# Patient Record
Sex: FEMALE | Race: WHITE | NOT HISPANIC OR LATINO
[De-identification: names, ages, dates, MRNs, and addresses within clinical notes are randomized per-mention and may not be internally consistent; named-entity substitution may affect disease eponyms.]

---

## 2017-05-18 ENCOUNTER — HOSPITAL (OUTPATIENT)
Dept: OTHER | Age: 82
End: 2017-05-18
Attending: INTERNAL MEDICINE

## 2017-06-29 ENCOUNTER — CHARTING TRANS (OUTPATIENT)
Dept: OTHER | Age: 82
End: 2017-06-29

## 2017-06-29 ASSESSMENT — PAIN SCALES - GENERAL: PAINLEVEL_OUTOF10: 0

## 2017-07-12 ENCOUNTER — HOSPITAL (OUTPATIENT)
Dept: OTHER | Age: 82
End: 2017-07-12
Attending: FAMILY MEDICINE

## 2017-07-12 ENCOUNTER — IMAGING SERVICES (OUTPATIENT)
Dept: OTHER | Age: 82
End: 2017-07-12

## 2017-07-13 ENCOUNTER — IMAGING SERVICES (OUTPATIENT)
Dept: OTHER | Age: 82
End: 2017-07-13

## 2017-09-07 ENCOUNTER — CHARTING TRANS (OUTPATIENT)
Dept: OTHER | Age: 82
End: 2017-09-07

## 2018-01-01 ENCOUNTER — APPOINTMENT (OUTPATIENT)
Dept: CT IMAGING | Facility: HOSPITAL | Age: 83
DRG: 066 | End: 2018-01-01
Attending: EMERGENCY MEDICINE
Payer: MEDICARE

## 2018-01-01 ENCOUNTER — HOSPITAL ENCOUNTER (INPATIENT)
Facility: HOSPITAL | Age: 83
LOS: 1 days | Discharge: HOSPICE | DRG: 066 | End: 2018-01-01
Attending: EMERGENCY MEDICINE | Admitting: HOSPITALIST
Payer: MEDICARE

## 2018-01-01 ENCOUNTER — HOSPITAL ENCOUNTER (INPATIENT)
Facility: HOSPITAL | Age: 83
LOS: 1 days | DRG: 066 | End: 2018-01-01
Attending: HOSPITALIST | Admitting: HOSPITALIST
Payer: OTHER MISCELLANEOUS

## 2018-01-01 VITALS
TEMPERATURE: 102 F | HEART RATE: 116 BPM | RESPIRATION RATE: 12 BRPM | OXYGEN SATURATION: 87 % | DIASTOLIC BLOOD PRESSURE: 51 MMHG | SYSTOLIC BLOOD PRESSURE: 101 MMHG

## 2018-01-01 VITALS
DIASTOLIC BLOOD PRESSURE: 86 MMHG | RESPIRATION RATE: 13 BRPM | OXYGEN SATURATION: 97 % | HEART RATE: 90 BPM | SYSTOLIC BLOOD PRESSURE: 119 MMHG | TEMPERATURE: 98 F

## 2018-01-01 DIAGNOSIS — I61.3 PONTINE HEMORRHAGE (HCC): Primary | ICD-10-CM

## 2018-01-01 LAB
ALBUMIN SERPL-MCNC: 3.6 G/DL (ref 3.5–4.8)
ALP LIVER SERPL-CCNC: 90 U/L (ref 55–142)
ALT SERPL-CCNC: 23 U/L (ref 14–54)
ANTIBODY SCREEN: NEGATIVE
APTT PPP: 28.3 SECONDS (ref 25–34)
AST SERPL-CCNC: 29 U/L (ref 15–41)
ATRIAL RATE: 98 BPM
BASOPHILS # BLD AUTO: 0.04 X10(3) UL (ref 0–0.1)
BASOPHILS NFR BLD AUTO: 0.4 %
BILIRUB SERPL-MCNC: 1.6 MG/DL (ref 0.1–2)
BUN BLD-MCNC: 22 MG/DL (ref 8–20)
CALCIUM BLD-MCNC: 9.4 MG/DL (ref 8.3–10.3)
CHLORIDE: 99 MMOL/L (ref 101–111)
CO2: 25 MMOL/L (ref 22–32)
CREAT BLD-MCNC: 1.04 MG/DL (ref 0.55–1.02)
EOSINOPHIL # BLD AUTO: 0.06 X10(3) UL (ref 0–0.3)
EOSINOPHIL NFR BLD AUTO: 0.6 %
ERYTHROCYTE [DISTWIDTH] IN BLOOD BY AUTOMATED COUNT: 13.1 % (ref 11.5–16)
GLUCOSE BLD-MCNC: 150 MG/DL (ref 70–99)
HCT VFR BLD AUTO: 43.2 % (ref 34–50)
HGB BLD-MCNC: 14.7 G/DL (ref 12–16)
IMMATURE GRANULOCYTE COUNT: 0.04 X10(3) UL (ref 0–1)
IMMATURE GRANULOCYTE RATIO %: 0.4 %
INR BLD: 1.16 (ref 0.89–1.11)
LYMPHOCYTES # BLD AUTO: 3.87 X10(3) UL (ref 0.9–4)
LYMPHOCYTES NFR BLD AUTO: 38.1 %
M PROTEIN MFR SERPL ELPH: 7.4 G/DL (ref 6.1–8.3)
MCH RBC QN AUTO: 33.4 PG (ref 27–33.2)
MCHC RBC AUTO-ENTMCNC: 34 G/DL (ref 31–37)
MCV RBC AUTO: 98.2 FL (ref 81–100)
MONOCYTES # BLD AUTO: 0.84 X10(3) UL (ref 0.1–1)
MONOCYTES NFR BLD AUTO: 8.3 %
NEUTROPHIL ABS PRELIM: 5.31 X10 (3) UL (ref 1.3–6.7)
NEUTROPHILS # BLD AUTO: 5.31 X10(3) UL (ref 1.3–6.7)
NEUTROPHILS NFR BLD AUTO: 52.2 %
PLATELET # BLD AUTO: 198 10(3)UL (ref 150–450)
POTASSIUM SERPL-SCNC: 3.6 MMOL/L (ref 3.6–5.1)
PSA SERPL DL<=0.01 NG/ML-MCNC: 14.9 SECONDS (ref 12–14.3)
Q-T INTERVAL: 348 MS
QRS DURATION: 78 MS
QTC CALCULATION (BEZET): 441 MS
R AXIS: 79 DEGREES
RBC # BLD AUTO: 4.4 X10(6)UL (ref 3.8–5.1)
RED CELL DISTRIBUTION WIDTH-SD: 46.6 FL (ref 35.1–46.3)
RH BLOOD TYPE: POSITIVE
SODIUM SERPL-SCNC: 134 MMOL/L (ref 136–144)
T AXIS: 4 DEGREES
VENTRICULAR RATE: 97 BPM
WBC # BLD AUTO: 10.2 X10(3) UL (ref 4–13)

## 2018-01-01 PROCEDURE — 70496 CT ANGIOGRAPHY HEAD: CPT | Performed by: EMERGENCY MEDICINE

## 2018-01-01 PROCEDURE — 70498 CT ANGIOGRAPHY NECK: CPT | Performed by: EMERGENCY MEDICINE

## 2018-01-01 PROCEDURE — 99231 SBSQ HOSP IP/OBS SF/LOW 25: CPT | Performed by: HOSPITALIST

## 2018-01-01 PROCEDURE — 99223 1ST HOSP IP/OBS HIGH 75: CPT | Performed by: HOSPITALIST

## 2018-01-01 RX ORDER — HYDROMORPHONE HYDROCHLORIDE 1 MG/ML
0.5 INJECTION, SOLUTION INTRAMUSCULAR; INTRAVENOUS; SUBCUTANEOUS EVERY 30 MIN PRN
Status: DISCONTINUED | OUTPATIENT
Start: 2018-01-01 | End: 2018-01-01 | Stop reason: ALTCHOICE

## 2018-01-01 RX ORDER — BISACODYL 10 MG
10 SUPPOSITORY, RECTAL RECTAL
Status: DISCONTINUED | OUTPATIENT
Start: 2018-01-01 | End: 2018-01-01

## 2018-01-01 RX ORDER — ONDANSETRON 4 MG/1
4 TABLET, ORALLY DISINTEGRATING ORAL EVERY 6 HOURS PRN
Status: DISCONTINUED | OUTPATIENT
Start: 2018-01-01 | End: 2018-01-01

## 2018-01-01 RX ORDER — ATROPINE SULFATE 10 MG/ML
2 SOLUTION/ DROPS OPHTHALMIC EVERY 2 HOUR PRN
Status: DISCONTINUED | OUTPATIENT
Start: 2018-01-01 | End: 2018-01-01

## 2018-01-01 RX ORDER — FUROSEMIDE 40 MG/1
40 TABLET ORAL EVERY 8 HOURS PRN
Status: DISCONTINUED | OUTPATIENT
Start: 2018-01-01 | End: 2018-01-01

## 2018-01-01 RX ORDER — LORAZEPAM 2 MG/ML
1 INJECTION INTRAMUSCULAR EVERY 4 HOURS PRN
Status: DISCONTINUED | OUTPATIENT
Start: 2018-01-01 | End: 2018-01-01

## 2018-01-01 RX ORDER — SCOLOPAMINE TRANSDERMAL SYSTEM 1 MG/1
1 PATCH, EXTENDED RELEASE TRANSDERMAL
Status: DISCONTINUED | OUTPATIENT
Start: 2018-01-01 | End: 2018-01-01

## 2018-01-01 RX ORDER — MORPHINE SULFATE 2 MG/ML
2 INJECTION, SOLUTION INTRAMUSCULAR; INTRAVENOUS EVERY 2 HOUR PRN
Status: DISCONTINUED | OUTPATIENT
Start: 2018-01-01 | End: 2018-01-01

## 2018-01-01 RX ORDER — MELATONIN
325
COMMUNITY
End: 2018-01-01

## 2018-01-01 RX ORDER — ASCORBIC ACID 500 MG
500 TABLET ORAL 2 TIMES DAILY
COMMUNITY
End: 2018-01-01

## 2018-01-01 RX ORDER — HALOPERIDOL 5 MG/ML
1 INJECTION INTRAMUSCULAR
Status: DISCONTINUED | OUTPATIENT
Start: 2018-01-01 | End: 2018-01-01

## 2018-01-01 RX ORDER — GLYCOPYRROLATE 0.2 MG/ML
0.4 INJECTION, SOLUTION INTRAMUSCULAR; INTRAVENOUS
Status: DISCONTINUED | OUTPATIENT
Start: 2018-01-01 | End: 2018-01-01

## 2018-01-01 RX ORDER — MORPHINE SULFATE 2 MG/ML
2 INJECTION, SOLUTION INTRAMUSCULAR; INTRAVENOUS ONCE
Status: COMPLETED | OUTPATIENT
Start: 2018-01-01 | End: 2018-01-01

## 2018-01-01 RX ORDER — MORPHINE SULFATE 2 MG/ML
1 INJECTION, SOLUTION INTRAMUSCULAR; INTRAVENOUS EVERY 2 HOUR PRN
Status: DISCONTINUED | OUTPATIENT
Start: 2018-01-01 | End: 2018-01-01

## 2018-01-01 RX ORDER — POTASSIUM CHLORIDE 750 MG/1
10 TABLET, EXTENDED RELEASE ORAL 2 TIMES DAILY
COMMUNITY
End: 2018-01-01

## 2018-01-01 RX ORDER — ONDANSETRON 2 MG/ML
4 INJECTION INTRAMUSCULAR; INTRAVENOUS EVERY 6 HOURS PRN
Status: DISCONTINUED | OUTPATIENT
Start: 2018-01-01 | End: 2018-01-01

## 2018-01-01 RX ORDER — LABETALOL HYDROCHLORIDE 5 MG/ML
INJECTION, SOLUTION INTRAVENOUS
Status: COMPLETED
Start: 2018-01-01 | End: 2018-01-01

## 2018-01-01 RX ORDER — SCOLOPAMINE TRANSDERMAL SYSTEM 1 MG/1
1 PATCH, EXTENDED RELEASE TRANSDERMAL
Status: CANCELLED | OUTPATIENT
Start: 2018-02-28

## 2018-01-01 RX ORDER — MORPHINE SULFATE 2 MG/ML
2 INJECTION, SOLUTION INTRAMUSCULAR; INTRAVENOUS ONCE
Status: CANCELLED | OUTPATIENT
Start: 2018-01-01 | End: 2018-01-01

## 2018-01-01 RX ORDER — MORPHINE SULFATE 2 MG/ML
1 INJECTION, SOLUTION INTRAMUSCULAR; INTRAVENOUS
Status: DISCONTINUED | OUTPATIENT
Start: 2018-01-01 | End: 2018-01-01

## 2018-01-01 RX ORDER — MORPHINE SULFATE 2 MG/ML
1 INJECTION, SOLUTION INTRAMUSCULAR; INTRAVENOUS
Status: CANCELLED | OUTPATIENT
Start: 2018-01-01

## 2018-01-01 RX ORDER — LORAZEPAM 2 MG/ML
0.5 INJECTION INTRAMUSCULAR EVERY 4 HOURS PRN
Status: DISCONTINUED | OUTPATIENT
Start: 2018-01-01 | End: 2018-01-01

## 2018-01-01 RX ORDER — SODIUM CHLORIDE 9 MG/ML
125 INJECTION, SOLUTION INTRAVENOUS CONTINUOUS
Status: DISCONTINUED | OUTPATIENT
Start: 2018-01-01 | End: 2018-01-01

## 2018-01-01 RX ORDER — SCOLOPAMINE TRANSDERMAL SYSTEM 1 MG/1
1 PATCH, EXTENDED RELEASE TRANSDERMAL
Status: DISCONTINUED | OUTPATIENT
Start: 2018-02-28 | End: 2018-01-01

## 2018-01-01 RX ORDER — LORAZEPAM 2 MG/ML
2 INJECTION INTRAMUSCULAR EVERY 4 HOURS PRN
Status: DISCONTINUED | OUTPATIENT
Start: 2018-01-01 | End: 2018-01-01

## 2018-01-01 RX ORDER — ONDANSETRON 2 MG/ML
8 INJECTION INTRAMUSCULAR; INTRAVENOUS EVERY 6 HOURS PRN
Status: DISCONTINUED | OUTPATIENT
Start: 2018-01-01 | End: 2018-01-01 | Stop reason: ALTCHOICE

## 2018-01-01 RX ORDER — ONDANSETRON 2 MG/ML
4 INJECTION INTRAMUSCULAR; INTRAVENOUS EVERY 4 HOURS PRN
Status: DISCONTINUED | OUTPATIENT
Start: 2018-01-01 | End: 2018-01-01

## 2018-01-01 RX ORDER — LABETALOL HYDROCHLORIDE 5 MG/ML
10 INJECTION, SOLUTION INTRAVENOUS ONCE
Status: COMPLETED | OUTPATIENT
Start: 2018-01-01 | End: 2018-01-01

## 2018-01-01 RX ORDER — HALOPERIDOL 5 MG/ML
2 INJECTION INTRAMUSCULAR
Status: DISCONTINUED | OUTPATIENT
Start: 2018-01-01 | End: 2018-01-01

## 2018-01-01 RX ORDER — FUROSEMIDE 10 MG/ML
40 INJECTION INTRAMUSCULAR; INTRAVENOUS EVERY 8 HOURS PRN
Status: DISCONTINUED | OUTPATIENT
Start: 2018-01-01 | End: 2018-01-01

## 2018-02-25 PROBLEM — I63.9 STROKE (HCC): Status: ACTIVE | Noted: 2018-01-01

## 2018-02-25 PROBLEM — I61.3 PONTINE HEMORRHAGE (HCC): Status: ACTIVE | Noted: 2018-01-01

## 2018-02-25 NOTE — CODE DOCUMENTATION
Stroke Navigator Note:    Responded to code stroke paged at 0621  Arrived to pt bedside A8 at 56.    80year old female fell out of bed this morning. Last known normal was 10 min prior to the fall which is 0520. EMS brought pt to ED.  Pt reportedly on E

## 2018-02-25 NOTE — ED PROVIDER NOTES
Patient Seen in: [facility] Emergency Department    History   Patient presents with:  Altered Mental Status (neurologic)    Stated Complaint: Unresponsive    HPI    80-year-old female, medical history as noted below, apparently on Eliquis for A. fib not verbal, she is breathing spontaneously does not appear in any distress. Head: Normocephalic and atraumatic. Nose: Nose normal.   Eyes: EOM are normal when observed passively. . Pupils are equal, round, and reactive to light. Neck: . Neck supple.  No CBC W/ DIFFERENTIAL[[id number]]          Abnormal            Final result                 Please view results for these tests on the individual orders. URINALYSIS WITH CULTURE REFLEX   TYPE AND SCREEN    Narrative:      The following orders were created The patient's daughter, Devante Cruz, confirmed right suspected that in this type of situation the patient would not want to be placed on a ventilator. I explained to her that she will unfortunately likely passed as a result of this.     Thus, aside from what is n

## 2018-02-25 NOTE — HOSPICE RN NOTE
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

## 2018-02-25 NOTE — PLAN OF CARE
Admitted to floor @4300 for comfort care. Hospice consulted. Oral suction completed for copious secretions. RT called for deep suctions. Some improvement noted. Morphine drip started. Pt appears more comfortable. Family updated on pt condition.  At

## 2018-02-25 NOTE — CM/SW NOTE
SW received consult for Hospice, referral sent to [facility] to meet with pt's family and offer choice. YUE confirmed with Shira Reddy RN from [facility] (C# [phone]) she will contact RN and pt's family.      ADDENDUM:  YUE spoke with Shira Reddy

## 2018-02-25 NOTE — ED NOTES
Pt returns from CT Scan, pt moving bilat arms and legs, moved her head slightly to voice command. Bilat pupils 2mm, pt slightly slumped over in bed, HOB remains elevated, pt unable to speak. Dr. Denney Primrose at bedside. Elevated BP noted. Orders received.

## 2018-02-25 NOTE — ED INITIAL ASSESSMENT (HPI)
Pt had witnessed fall out of bed, pt arrives via EMS unresponsive and slightly slumped over. Pt was responsive just prior to fall.

## 2018-02-25 NOTE — ED NOTES
Pt's HOB lowered to move pt to CT table, pt began to have gurgling respirations, pt suctioned with [place]auer, pt remains on 3L/NC, +weak gag reflex noted with suctioning.

## 2018-02-25 NOTE — H&P
[facility]  History and Physical     Dutch Brookwood Patient Status:  Observation    1918 MRN [id number]   [facility] 7NE-A Attending Manav Hathaway MD   Hosp Day # 0 PCP Jose Cosby, DO     Chief Complaint: annmarie hemorrh HYDROcodone-acetaminophen (NORCO) 5-325 MG Oral Tab Take 1 tablet by mouth every 6 (six) hours as needed for Pain.  Disp:  Rfl:    Levothyroxine Sodium (SYNTHROID, LEVOTHROID) 25 MCG Oral Tab 25 mcg before breakfast. Disp:  Rfl:    loratadine (CLARITIN) 1 requesting; will initiate comfort cares order set. Hospice consult placed.       Quality:  · DVT Prophylaxis: not needed  · CODE status: DNR  · Browne: will place for comfort    Plan of care discussed with rn and family    Susana Villafuerte MD  2/25/2018

## 2018-02-25 NOTE — PROGRESS NOTES
02/25/18 1439   Clinical Encounter Type   Visited With Family  (Pt.s son and daughter and extended family members at bedside)   Routine Visit Follow-up  (Responded to request for consult)   Continue Visiting Yes  (RN shared pt. is activley dying)   Edwige

## 2018-02-25 NOTE — PROGRESS NOTES
02/25/18 0737   Clinical Encounter Type   Visited With Patient and family together  (Daughter, son and DIL at bedside.   More family enroute.)   Routine Visit Introduction   Continue Visiting No  (The family was informed to call the , as needed)

## 2018-02-25 NOTE — HOSPICE RN NOTE
Checked in on patient again. Morphine drip is at 1mg/hour for her dyspnea. Rings removed by family member before the swelling in hands is evident. Family member has the rings. POC was discussed with Serena Pavon RN and family. All in agreement with this POC

## 2018-02-25 NOTE — CONSULTS
Neurological Surgery Attending    Inocencia Thurman    History: Paged by emergency room, Dr. William Rubio this morning. Admitted with eventually fail pontine hemorrhage. Imaging reviewed and agree with family decision for no resuscitation.   Dr. Denney Primrose reported

## 2018-02-26 NOTE — PROGRESS NOTES
[facility]  Progress note     Carole Thorpe Patient Status:  Inpatient    1918 MRN [id number]   [facility] 7NE-A Attending Urban Woods MD   Hosp Day # 1 PCP Gerri Lind DO     Chief Complaint: hospice care  Subject

## 2018-02-26 NOTE — PLAN OF CARE
Assumed care @ 1930. Pt unresponsive. Breathing unlabored. Hospice care. Morphine gtt at 3mg/hr. Pt appears comfortable. Sons at bedside overnight. Browne intact, low urine output. Will continue to monitor.      PAIN - ADULT    • Verbalizes/displa

## 2018-02-26 NOTE — H&P
[facility]  History and Physical     Bhakti Sanchez Patient Status:  Inpatient    1918 MRN [id number]   [facility] 7NE-A Attending Lon Bone MD   Hosp Day # 1 PCP Deborah Bunch DO     Chief Complaint: hospice care or lesions.    Psychiatric: unresponsive       Diagnostic Data:      Labs:  Recent Labs   Lab  02/25/18   0600  02/25/18   0622   WBC  10.2   --    HGB  14.7   --    MCV  98.2   --    PLT  198.0   --    INR   --   1.16*       Recent Labs   Lab  02/25/18   0

## 2018-02-26 NOTE — PLAN OF CARE
[facility]    Death/Expiration note    Olga Thurman Patient Status:  Inpatient    1918 MRN [id number]   [facility] 7NE-A Attending Angela Momin MD   [address] Day # 1 PCP Richie Perez DO         Date and Time of Death:

## 2018-02-27 NOTE — DISCHARGE SUMMARY
[facility] [place] [facility]OSPITALIST  DISCHARGE SUMMARY     [address] [facility] Patient Status:  Inpatient    1918 MRN [id number]   [facility] 7NE-A Attending No att. providers found   Hosp Day # 1 PCP Jozef Berkowitz DO     Date of Admission: 2018  D

## 2018-11-02 VITALS
RESPIRATION RATE: 16 BRPM | HEIGHT: 59 IN | TEMPERATURE: 96.9 F | WEIGHT: 119.05 LBS | SYSTOLIC BLOOD PRESSURE: 124 MMHG | BODY MASS INDEX: 24 KG/M2 | HEART RATE: 68 BPM | DIASTOLIC BLOOD PRESSURE: 78 MMHG

## 2018-11-03 VITALS
OXYGEN SATURATION: 96 % | DIASTOLIC BLOOD PRESSURE: 88 MMHG | TEMPERATURE: 97 F | WEIGHT: 120.99 LBS | RESPIRATION RATE: 16 BRPM | BODY MASS INDEX: 24.39 KG/M2 | HEIGHT: 59 IN | SYSTOLIC BLOOD PRESSURE: 168 MMHG | HEART RATE: 72 BPM

## 2019-08-29 ENCOUNTER — ADVANCED DIRECTIVES (OUTPATIENT)
Dept: HEALTH INFORMATION MANAGEMENT | Age: 84
End: 2019-08-29